# Patient Record
Sex: MALE | Race: WHITE | NOT HISPANIC OR LATINO | URBAN - METROPOLITAN AREA
[De-identification: names, ages, dates, MRNs, and addresses within clinical notes are randomized per-mention and may not be internally consistent; named-entity substitution may affect disease eponyms.]

---

## 2023-10-19 ENCOUNTER — OFFICE VISIT (OUTPATIENT)
Dept: URGENT CARE | Facility: CLINIC | Age: 53
End: 2023-10-19
Payer: OTHER GOVERNMENT

## 2023-10-19 VITALS
DIASTOLIC BLOOD PRESSURE: 88 MMHG | OXYGEN SATURATION: 97 % | SYSTOLIC BLOOD PRESSURE: 137 MMHG | WEIGHT: 216 LBS | TEMPERATURE: 97.7 F | HEART RATE: 66 BPM

## 2023-10-19 DIAGNOSIS — R68.84 PAIN IN LOWER JAW: ICD-10-CM

## 2023-10-19 DIAGNOSIS — K12.2 ORAL INFECTION: Primary | ICD-10-CM

## 2023-10-19 PROCEDURE — 99203 OFFICE O/P NEW LOW 30 MIN: CPT

## 2023-10-19 RX ORDER — PENICILLIN V POTASSIUM 500 MG/1
500 TABLET, FILM COATED ORAL 4 TIMES DAILY
Qty: 40 TABLET | Refills: 0 | Status: SHIPPED | OUTPATIENT
Start: 2023-10-19 | End: 2023-10-19 | Stop reason: SDUPTHER

## 2023-10-19 RX ORDER — PENICILLIN V POTASSIUM 500 MG/1
500 TABLET, FILM COATED ORAL 4 TIMES DAILY
Qty: 40 TABLET | Refills: 0 | Status: SHIPPED | OUTPATIENT
Start: 2023-10-19 | End: 2023-10-29

## 2023-10-19 RX ORDER — PREDNISONE 10 MG/1
TABLET ORAL
Qty: 11 TABLET | Refills: 0 | Status: SHIPPED | OUTPATIENT
Start: 2023-10-19 | End: 2023-10-29

## 2023-10-19 RX ORDER — PREDNISONE 10 MG/1
TABLET ORAL
Qty: 11 TABLET | Refills: 0 | Status: SHIPPED | OUTPATIENT
Start: 2023-10-19 | End: 2023-10-19 | Stop reason: SDUPTHER

## 2023-10-19 NOTE — PATIENT INSTRUCTIONS
ORAL INFECTION      - PENICILLIN VK 10 days has been prescribed to treat infection in the mouth   - PREDNISONE (steroid) has been prescribed to reduce inflammation and pain      - Warm salt water oral rinses may help decrease inflammation and promote drainage of infection in the mouth     - Recommended follow-up with your dentist ASAP for more extensive treatment to prevent reoccurrence    - If Medicaid insurance and/or difficulty finding a dentist, there are free or reduced cost dental clinics that accept Medicaid / offer payment plans listed below:     (many offer same/next day appointments)             + [facility] ([address]) [phone]           + [facility] ([address] [facility]) [phone]           + [facility] ([address] [facility]) [phone]           + [facility] ([address]) [phone]             + [facility] ([address] [facility]) [phone]           + [facility] ([address] [facility]) [phone]           + [facility] ([address] [facility]) [phone]           + [facility] ([address] [facility]) [phone]           + [facility] ([address] [facility]) [phone]           + [facility] ([address]) [phone]           + [facility] ([address] [facility]) [phone]

## 2023-10-19 NOTE — PROGRESS NOTES
Subjective     Efrem Turner is a 53 y.o. male who presents for Jaw Pain.    Patient presents with right lower jaw pain worsening and radiating to the right ear for the last 2 weeks. He states that his tooth is broken and sensitive to heat.       History provided by:  Patient and medical records      /88   Pulse 66   Temp 36.5 °C (97.7 °F)   Wt 98 kg (216 lb)   SpO2 97%    All vitals have been reviewed and are stable.    Review of Systems   Constitutional: Negative.  Negative for chills, diaphoresis and fever.   HENT:  Positive for dental problem and ear pain. Negative for congestion, drooling and rhinorrhea.    Respiratory: Negative.  Negative for cough and shortness of breath.    Cardiovascular: Negative.  Negative for chest pain.   Gastrointestinal:  Negative for abdominal pain, diarrhea and vomiting.   Musculoskeletal: Negative.  Negative for myalgias.   Skin: Negative.  Negative for rash.   Neurological: Negative.  Negative for dizziness and headaches.       Objective   Physical Exam  Vitals and nursing note reviewed.   Constitutional:       General: He is awake. He is not in acute distress.     Appearance: Normal appearance. He is well-developed.   HENT:      Head: Normocephalic and atraumatic.      Jaw: Tenderness and pain on movement present.      Nose: Nose normal.      Mouth/Throat:      Lips: Pink.      Mouth: Mucous membranes are moist. No oral lesions.      Dentition: Abnormal dentition. Dental tenderness and gingival swelling present.      Pharynx: Oropharynx is clear. Uvula midline.   Eyes:      Extraocular Movements: Extraocular movements intact.      Conjunctiva/sclera: Conjunctivae normal.      Pupils: Pupils are equal, round, and reactive to light.   Cardiovascular:      Rate and Rhythm: Normal rate and regular rhythm.   Pulmonary:      Effort: Pulmonary effort is normal. No respiratory distress.   Abdominal:      General: Abdomen is flat. There is no distension.   Musculoskeletal:          General: No swelling or deformity. Normal range of motion.      Cervical back: Normal range of motion.   Skin:     General: Skin is warm and dry.   Neurological:      General: No focal deficit present.      Mental Status: He is alert and oriented to person, place, and time. Mental status is at baseline.      Motor: Motor function is intact.      Coordination: Coordination is intact.   Psychiatric:         Mood and Affect: Mood and affect normal.         Speech: Speech normal.         Behavior: Behavior normal.         Thought Content: Thought content normal.         Judgment: Judgment normal.         Assessment/Plan   Problem List Items Addressed This Visit    None  Visit Diagnoses       Oral infection    -  Primary    Pain in lower jaw                Red flags for reporting to ER have been reviewed with the patient.    Current diagnosis, any medication changes, and all in-office lab or radiologic results have been reviewed with the patient at the time of the visit.   If symptoms do not improve or worsen, patient is to follow up with PCP or report to the emergency room.   Patient is alert and oriented x3 and non-toxic appearing. Vital signs are stable.   Patient and/or guardian has sufficient decision-making capabilities at this time and reports understanding and agreement with the treatment plan made through shared decision-making.

## 2023-11-01 ASSESSMENT — ENCOUNTER SYMPTOMS
CONSTITUTIONAL NEGATIVE: 1
COUGH: 0
VOMITING: 0
FEVER: 0
ABDOMINAL PAIN: 0
RHINORRHEA: 0
DIAPHORESIS: 0
DIARRHEA: 0
CARDIOVASCULAR NEGATIVE: 1
MYALGIAS: 0
HEADACHES: 0
RESPIRATORY NEGATIVE: 1
CHILLS: 0
NEUROLOGICAL NEGATIVE: 1
MUSCULOSKELETAL NEGATIVE: 1
SHORTNESS OF BREATH: 0
DIZZINESS: 0